# Patient Record
Sex: MALE | Race: WHITE | NOT HISPANIC OR LATINO | URBAN - METROPOLITAN AREA
[De-identification: names, ages, dates, MRNs, and addresses within clinical notes are randomized per-mention and may not be internally consistent; named-entity substitution may affect disease eponyms.]

---

## 2022-01-30 ENCOUNTER — EMERGENCY (EMERGENCY)
Facility: HOSPITAL | Age: 67
LOS: 1 days | Discharge: HOME | End: 2022-01-30
Attending: STUDENT IN AN ORGANIZED HEALTH CARE EDUCATION/TRAINING PROGRAM
Payer: COMMERCIAL

## 2022-01-30 VITALS
HEIGHT: 72 IN | WEIGHT: 315 LBS | DIASTOLIC BLOOD PRESSURE: 136 MMHG | TEMPERATURE: 98 F | OXYGEN SATURATION: 99 % | SYSTOLIC BLOOD PRESSURE: 177 MMHG | RESPIRATION RATE: 20 BRPM | HEART RATE: 98 BPM

## 2022-01-30 VITALS
RESPIRATION RATE: 19 BRPM | DIASTOLIC BLOOD PRESSURE: 117 MMHG | HEART RATE: 96 BPM | SYSTOLIC BLOOD PRESSURE: 187 MMHG | OXYGEN SATURATION: 97 %

## 2022-01-30 DIAGNOSIS — Z98.890 OTHER SPECIFIED POSTPROCEDURAL STATES: Chronic | ICD-10-CM

## 2022-01-30 PROCEDURE — 71250 CT THORAX DX C-: CPT | Mod: MD

## 2022-01-30 PROCEDURE — 99284 EMERGENCY DEPT VISIT MOD MDM: CPT | Mod: 25

## 2022-01-30 PROCEDURE — 99284 EMERGENCY DEPT VISIT MOD MDM: CPT

## 2022-01-30 PROCEDURE — 71250 CT THORAX DX C-: CPT | Mod: 26,MD

## 2022-01-30 RX ORDER — LIDOCAINE 4 G/100G
1 CREAM TOPICAL ONCE
Refills: 0 | Status: COMPLETED | OUTPATIENT
Start: 2022-01-30 | End: 2022-01-30

## 2022-01-30 RX ADMIN — LIDOCAINE 1 PATCH: 4 CREAM TOPICAL at 13:42

## 2022-01-30 NOTE — ED ADULT TRIAGE NOTE - CHIEF COMPLAINT QUOTE
Pt [facility]  for  possible small hemothorax due to slip and fall landing on the l side causing 5th rib FX  - injury three days ago . Breathing easy and unlabored. Denies any difficukty breathing

## 2022-01-30 NOTE — ED ADULT NURSE REASSESSMENT NOTE - NS ED NURSE REASSESS COMMENT FT1
Pt is A&O x4, pt is up to chest CT, pt showed no signs of respiratory distress, will continue to monitor upon arrival from [place]

## 2022-01-30 NOTE — ED ADULT NURSE REASSESSMENT NOTE - NS ED NURSE REASSESS COMMENT FT1
Pt is A&O x4, pt had a BP of 187/117, Chloe DOBBINS made aware, pt was discharged and will see his primary care doctor and take his medications at home Pt is A&O x4, pt had a BP of 187/117, Chloe DOBBINS made aware, as per Chloe DOBBINS pt to be discharged and will see his primary care doctor and take his medications at home

## 2022-01-30 NOTE — ED PROVIDER NOTE - OBJECTIVE STATEMENT
66 yom pmh htn, dm here from urgent care for possible rib fx. Missed a step 3 days ago and fell hitting the left side of his rib. Still with pain, only when he moves. Went to [facility] today and had cxr showing possible rib fx. and sent to ED for eval 66 yom pmh htn, dm here from urgent care for possible rib fx. Missed a step 3 days ago and fell hitting the left side of his rib. Still with pain, only when he moves. Took tylenol in the last few days. [facility] and [facility] showing possible rib fx. and sent to ED for eval. Denies hitting head. no trouble walking. not on AC.

## 2022-01-30 NOTE — ED PROVIDER NOTE - CARE PLAN
1 Principal Discharge DX:	Chest wall pain   Principal Discharge DX:	Traumatic closed nondisplaced fracture of one rib, left, initial encounter

## 2022-01-30 NOTE — ED ADULT NURSE NOTE - NSIMPLEMENTINTERV_GEN_ALL_ED
Implemented All Fall Risk Interventions:  [place] to call system. Call bell, personal items and telephone within reach. Instruct patient to call for assistance. Room bathroom lighting operational. Non-slip footwear when patient is off stretcher. Physically safe environment: no spills, clutter or unnecessary equipment. Stretcher in lowest position, wheels locked, appropriate side rails in place. Provide visual cue, wrist band, yellow gown, etc. Monitor gait and stability. Monitor for mental status changes and reorient to person, place, and time. Review medications for side effects contributing to fall risk. Reinforce activity limits and safety measures with patient and family.

## 2022-01-30 NOTE — ED ADULT NURSE NOTE - CHIEF COMPLAINT QUOTE
Pt sent from [facility]  for  possible small hemothorax due to slip and fall landing on the l side causing 5th rib FX  - injury three days ago . Breathing easy and unlabored. Denies any difficukty breathing Pt sent from [facility]  for  possible small hemothorax due to slip and fall landing on the l side causing 5th rib FX  - injury three days ago . Breathing easy and unlabored. Denies any difficulty breathing

## 2022-01-30 NOTE — ED PROVIDER NOTE - PROGRESS NOTE DETAILS
patient informed of ct findings of nodules and enlarged ascending aorta which he knew about. return precautions discussed. print out of results given. comfortable with discharge

## 2022-01-30 NOTE — ED ADULT NURSE NOTE - OBJECTIVE STATEMENT
Pt is A&O X4, PT was handing groceries to his wife on Thursday night when he slipped and fell on the left side of his back and landed on the molding of the front door, pt is showing no signs of respiratory distress or pain, will continue to monitor

## 2022-01-30 NOTE — ED PROVIDER NOTE - PHYSICAL EXAMINATION
Vital Signs per nursing documentation  Gen: well appearing, no acute distress  HEENT: NCAT, MMM  Cardiac: regular rate rhythm, normal S1S2  Chest: clear to auscultation bilateral, minimal +TTP to left chest wall, no bruising   Abdomen: soft, non tender non distended  Extremity: no gross deformity, good perfusion  Skin: no rash  Neuro: nonfocal neuro exam, gait steady

## 2022-01-30 NOTE — ED PROVIDER NOTE - NSFOLLOWUPINSTRUCTIONS_ED_ALL_ED_FT
Take ibuprofen or acetaminophen as needed for pain. can also use lidocaine patches topically. Return for any worsening or concerning symptoms     Other items you need to know about rib fractures:   •The most common cause is blunt trauma from a fall or car accident. Trauma can increase your risk for organ damage when your rib is fractured.      •Older age, osteoporosis, or a tumor can increase your risk for rib fractures.      •A stress fracture can happen in your upper or middle ribs. Stress fractures can happen when you have a forceful long-term cough. They can also be caused by forceful athletic movements, such as in golf, throwing, or rowing.      •A condition called flail chest occurs if 3 or more of your ribs are broken in 2 or more places. This condition may make it hard for you to breathe.      Common signs and symptoms include:   •Chest wall pain that worsens when you breathe, move, or cough      •Bruising or swelling near your injury      •Shortness of breath or difficulty taking a deep breath      Call your local emergency number (911 in the ) if:   •You have trouble breathing.      •You have new or increased pain.      Seek care immediately if:   •Your pain does not get better, even after treatment.      •You have a fever or a cough.      Call your doctor if:   •You have questions or concerns about your condition or care.          Medicines: You may need any of the following:  •NSAIDs, such as ibuprofen, help decrease swelling, pain, and fever. This medicine is available with or without a doctor's order. NSAIDs can cause stomach bleeding or kidney problems in certain people. If you take blood thinner medicine, always ask your healthcare provider if NSAIDs are safe for you. Always read the medicine label and follow directions.      •Prescription pain medicine may be given. Ask your healthcare provider how to take this medicine safely. Some prescription pain medicines contain acetaminophen. Do not take other medicines that contain acetaminophen without talking to your healthcare provider. Too much acetaminophen may cause liver damage. Prescription pain medicine may cause constipation. Ask your healthcare provider how to prevent or treat constipation.       •Take your medicine as directed. Contact your healthcare provider if you think your medicine is not helping or if you have side effects. Tell him or her if you are allergic to any medicine. Keep a list of the medicines, vitamins, and herbs you take. Include the amounts, and when and why you take them. Bring the list or the pill bottles to follow-up visits. Carry your medicine list with you in case of an emergency.      Self-care:   •Take deep breaths and cough 10 times each hour. This will decrease your risk for a lung infection. Hug a pillow on your injured side to decrease pain while you take deep breaths. Take a deep breath and hold it for as long as you can. Let the air out and then cough. Deep breaths help open your airway. You may be given an incentive spirometer to help you take deep breaths. Put the plastic piece in your mouth and take a slow, deep breath, then let the air out and cough. Repeat these steps 10 times every hour.  How to use and Incentive Spirometer           •Rest and limit activity as directed. Do not pull, push, or lift objects. Start to do more as your pain decreases. Ask your healthcare provider how much activity you can do.      •Apply ice on your chest near your fractured rib for 15 to 20 minutes every hour or as directed. Use an ice pack, or put crushed ice in a plastic bag. Cover it with a towel. Ice helps prevent tissue damage and decreases swelling and pain.      Follow up with your doctor as directed: Write down your questions so you remember to ask them during your visits.

## 2022-01-30 NOTE — ED PROVIDER NOTE - MDM PATIENT STATEMENT FOR ADDL TREATMENT
Pt returned Homa's phone call, she does need the refill on the Chantix. Pt is now using the [facility] on [address].      Pt # [phone] Patient with one or more new problems requiring additional work-up/treatment.

## 2022-01-30 NOTE — ED ADULT NURSE REASSESSMENT NOTE - NS ED NURSE REASSESS COMMENT FT1
Pt is A&0 X4,  resting comfortably in bed, pt shows no signs of pain or respiratory distress, will continue to monitor

## 2022-01-30 NOTE — ED PROVIDER NOTE - NS ED ROS FT
ROS:  GEN: (-) fevers/chills  NECK: (-) stiffness, (-) swelling  RESP: (-) shortness of breath  CV: (-) chest pain, (-) palpitations  GI: (-) nausea, (-) vomiting, (-) pain  : (-) hematuria, (-) dysuria  EXT: (-) edema  NEURO: (-) weakness, (-) headache  MSK: (-) muscle pain

## 2022-01-30 NOTE — ED PROVIDER NOTE - PATIENT PORTAL LINK FT
You can access the FollowMyHealth Patient Portal offered by [facility] by registering at the following website: [email or web address]. By joining [employer]’s FollowMyHealth portal, you will also be able to view your health information using other applications (apps) compatible with our system.